# Patient Record
Sex: FEMALE | Race: WHITE
[De-identification: names, ages, dates, MRNs, and addresses within clinical notes are randomized per-mention and may not be internally consistent; named-entity substitution may affect disease eponyms.]

---

## 2018-04-10 ENCOUNTER — HOSPITAL ENCOUNTER (EMERGENCY)
Dept: HOSPITAL 26 - MED | Age: 18
Discharge: HOME | End: 2018-04-10
Payer: MEDICARE

## 2018-04-10 VITALS — BODY MASS INDEX: 23.98 KG/M2 | HEIGHT: 61 IN | WEIGHT: 127 LBS

## 2018-04-10 VITALS — SYSTOLIC BLOOD PRESSURE: 122 MMHG | DIASTOLIC BLOOD PRESSURE: 65 MMHG

## 2018-04-10 VITALS — DIASTOLIC BLOOD PRESSURE: 78 MMHG | SYSTOLIC BLOOD PRESSURE: 115 MMHG

## 2018-04-10 DIAGNOSIS — O26.891: Primary | ICD-10-CM

## 2018-04-10 DIAGNOSIS — R11.0: ICD-10-CM

## 2018-04-10 DIAGNOSIS — R10.32: ICD-10-CM

## 2018-04-10 LAB
APPEARANCE UR: (no result)
BASOPHILS # BLD AUTO: 0 K/UL (ref 0–0.22)
BASOPHILS NFR BLD AUTO: 0.6 % (ref 0–2)
BILIRUB UR QL STRIP: NEGATIVE
COLOR UR: YELLOW
EOSINOPHIL # BLD AUTO: 0.1 K/UL (ref 0–0.4)
EOSINOPHIL NFR BLD AUTO: 1.8 % (ref 0–4)
ERYTHROCYTE [DISTWIDTH] IN BLOOD BY AUTOMATED COUNT: 13.1 % (ref 11.6–13.7)
GLUCOSE UR STRIP-MCNC: NEGATIVE MG/DL
HCT VFR BLD AUTO: 40.3 % (ref 36–48)
HGB BLD-MCNC: 13.8 G/DL (ref 12–16)
HGB UR QL STRIP: NEGATIVE
LEUKOCYTE ESTERASE UR QL STRIP: NEGATIVE
LYMPHOCYTES # BLD AUTO: 1.9 K/UL (ref 2.5–16.5)
LYMPHOCYTES NFR BLD AUTO: 23.8 % (ref 20.5–51.1)
MCH RBC QN AUTO: 32 PG (ref 27–31)
MCHC RBC AUTO-ENTMCNC: 34 G/DL (ref 33–37)
MCV RBC AUTO: 94.4 FL (ref 80–94)
MONOCYTES # BLD AUTO: 0.7 K/UL (ref 0.8–1)
MONOCYTES NFR BLD AUTO: 8.1 % (ref 1.7–9.3)
NEUTROPHILS # BLD AUTO: 5.3 K/UL (ref 1.8–7.7)
NEUTROPHILS NFR BLD AUTO: 65.7 % (ref 42.2–75.2)
NITRITE UR QL STRIP: NEGATIVE
PH UR STRIP: 6 [PH] (ref 5–9)
PLATELET # BLD AUTO: 213 K/UL (ref 140–450)
RBC # BLD AUTO: 4.27 MIL/UL (ref 4.2–5.4)
RBC #/AREA URNS HPF: (no result) /HPF (ref 0–5)
WBC # BLD AUTO: 8.1 K/UL (ref 4.5–11)
WBC,URINE: (no result) /HPF (ref 0–5)

## 2018-04-10 NOTE — NUR
Patient discharged with v/s stable. Written and verbal after care instructions 
given and explained. 

Patient verbalized understanding. Ambulatory with STEADY GAIT. All questions 
addressed prior to discharge. Advised to follow up with PMD.

## 2018-04-10 NOTE — NUR
PT COMES TO ED C/O ABD CRAMPING ANF PAIN FOR 2 WEEKS. PT REPORTS BEING 7 WEEKS 
PREGNANT. DENIES CP SOB N/V OR DIZZINESS.

## 2018-08-13 ENCOUNTER — HOSPITAL ENCOUNTER (INPATIENT)
Dept: HOSPITAL 26 - MLD | Age: 18
Discharge: HOME | DRG: 566 | End: 2018-08-13
Attending: OBSTETRICS & GYNECOLOGY | Admitting: OBSTETRICS & GYNECOLOGY
Payer: MEDICARE

## 2018-08-13 VITALS — BODY MASS INDEX: 25.49 KG/M2 | WEIGHT: 135 LBS | HEIGHT: 61 IN

## 2018-08-13 VITALS — SYSTOLIC BLOOD PRESSURE: 101 MMHG | DIASTOLIC BLOOD PRESSURE: 61 MMHG

## 2018-08-13 DIAGNOSIS — R51: ICD-10-CM

## 2018-08-13 DIAGNOSIS — Z3A.25: ICD-10-CM

## 2018-08-13 DIAGNOSIS — R10.9: ICD-10-CM

## 2018-08-13 DIAGNOSIS — O26.892: Primary | ICD-10-CM

## 2018-08-13 NOTE — NUR
PATIENT HAS BEEN SCREENED AND CATEGORIZED AS LOW NUTRITION RISK. PATIENT WILL BE SEEN WITHIN 
7 DAYS OF ADMISSION.



8/19/18



[address]

## 2018-08-13 NOTE — NUR
CM NOTE

PER ACCOUNTABLE [facility] CM COORDINATOR EVONNE FAROOQ PH# [phone], SEND REVIEWS TO [facility] 
AND [facility] AND NO ASSIGNED CM AT THIS TIME.  PER EVONNE FAROOQ, FOR ANY DC NEEDS TO 
CONTACT [facility].

INITIAL REVIEW FAXED TO [facility] [phone] [place] PH# [phone]  [facility] 
[phone] PH# [phone]

## 2018-09-13 ENCOUNTER — HOSPITAL ENCOUNTER (EMERGENCY)
Dept: HOSPITAL 26 - MED | Age: 18
Discharge: HOME | End: 2018-09-13
Payer: MEDICARE

## 2018-09-13 VITALS — SYSTOLIC BLOOD PRESSURE: 103 MMHG | DIASTOLIC BLOOD PRESSURE: 66 MMHG

## 2018-09-13 VITALS — WEIGHT: 148.25 LBS | HEIGHT: 62 IN | BODY MASS INDEX: 27.28 KG/M2

## 2018-09-13 VITALS — SYSTOLIC BLOOD PRESSURE: 108 MMHG | DIASTOLIC BLOOD PRESSURE: 72 MMHG

## 2018-09-13 DIAGNOSIS — Z91.018: ICD-10-CM

## 2018-09-13 DIAGNOSIS — R21: Primary | ICD-10-CM

## 2018-09-13 PROCEDURE — 99283 EMERGENCY DEPT VISIT LOW MDM: CPT

## 2018-09-13 PROCEDURE — 81002 URINALYSIS NONAUTO W/O SCOPE: CPT

## 2018-09-13 PROCEDURE — 81025 URINE PREGNANCY TEST: CPT

## 2018-09-13 NOTE — NUR
PATIENT IS A 17 Y/O FEMALE WHO PRESENTS TO THE ED C/O RASH. PT STATES THAT IT 
STARTED X1 DAY AGO. PT REPORTS 5/10 ACHING R LEG PAIN. NOTED RED CIRCLES OF 
VARIOUS SIZES ON R LEG. PT DENIES CP, SOB, N/V/D. PT AWAKE AND ALERT, RR 
EVEN/UNLABORED. PT REPOSITIONED FOR COMFORT, BED IN LOWEST POSITION. ER MD DR. SWENSON NOTIFIED. WILL CONTINUE TO MONITOR.

## 2018-09-13 NOTE — NUR
Patient discharged with v/s stable. Written and verbal after care instructions 
given and explained. 

Patient alert, oriented and verbalized understanding of instructions. 
Ambulatory with steady gait. All questions addressed prior to discharge. ID 
band removed. Patient advised to follow up with PMD. Rx of KEFLEX 500MG, 
DIPHENHYDRAMINE 25MG AND HYDROCORTISONE 1% TOPICAL given. Patient educated on 
indication of medication including possible reaction and side effects. 
Opportunity to ask questions provided and answered.